# Patient Record
Sex: MALE | Race: WHITE | NOT HISPANIC OR LATINO | URBAN - METROPOLITAN AREA
[De-identification: names, ages, dates, MRNs, and addresses within clinical notes are randomized per-mention and may not be internally consistent; named-entity substitution may affect disease eponyms.]

---

## 2017-12-19 ENCOUNTER — OFFICE VISIT (OUTPATIENT)
Dept: URGENT CARE | Facility: CLINIC | Age: 37
End: 2017-12-19

## 2017-12-19 VITALS
DIASTOLIC BLOOD PRESSURE: 84 MMHG | TEMPERATURE: 97.4 F | SYSTOLIC BLOOD PRESSURE: 112 MMHG | HEIGHT: 71 IN | OXYGEN SATURATION: 98 % | BODY MASS INDEX: 31.08 KG/M2 | RESPIRATION RATE: 20 BRPM | HEART RATE: 82 BPM | WEIGHT: 222 LBS

## 2017-12-19 DIAGNOSIS — R07.9 CHEST PAIN, UNSPECIFIED TYPE: ICD-10-CM

## 2017-12-19 DIAGNOSIS — R20.2 PARESTHESIA OF ARM: ICD-10-CM

## 2017-12-19 DIAGNOSIS — F41.9 ANXIETY: ICD-10-CM

## 2017-12-19 PROCEDURE — 99204 OFFICE O/P NEW MOD 45 MIN: CPT | Performed by: PHYSICIAN ASSISTANT

## 2017-12-19 RX ORDER — HYDROXYZINE HYDROCHLORIDE 25 MG/1
25 TABLET, FILM COATED ORAL 3 TIMES DAILY PRN
Qty: 30 TAB | Refills: 0 | Status: SHIPPED | OUTPATIENT
Start: 2017-12-19

## 2017-12-19 ASSESSMENT — ENCOUNTER SYMPTOMS
PALPITATIONS: 0
VOMITING: 0
MYALGIAS: 0
DIARRHEA: 0
TREMORS: 0
FLANK PAIN: 0
CHILLS: 0
NERVOUS/ANXIOUS: 1
WHEEZING: 0
NECK PAIN: 0
SHORTNESS OF BREATH: 0
LOSS OF CONSCIOUSNESS: 0
FEVER: 0
SPEECH CHANGE: 0
BRUISES/BLEEDS EASILY: 0
FOCAL WEAKNESS: 0
BACK PAIN: 0
ABDOMINAL PAIN: 0
SEIZURES: 0
COUGH: 0
TINGLING: 1
BLOOD IN STOOL: 0
SENSORY CHANGE: 1
NAUSEA: 0
DIZZINESS: 0
CONSTIPATION: 0
EXTREMITY NUMBNESS: 1
SPUTUM PRODUCTION: 0

## 2017-12-19 NOTE — PROGRESS NOTES
Subjective:     Arnav Hernandez is a 37 y.o. male who presents for Chest Pain and Numbness (LT arm)       Pt notes last one week of checking BP, noted some tingling to hands notes while watching tv, wakes from sleep, driving, BP in the am has ranged in 120-130/80-90, felt trace pain to chest. Denies PMH of similar prior to last one week. Notes exercised a fair amount but not over last one week, noted mild chest pain last night and upon waking this am, had physical labor yesterday w/ no chest pain. C/o intermittent mild tingling to all fingers of bilat hands, PMH of PTSD and some anxiety, may contribute per pt, denies recent stimulants, cut out coffee last few days       Chest Pain    This is a new problem. Pertinent negatives include no abdominal pain, back pain, cough, dizziness, fever, nausea, palpitations, shortness of breath, sputum production or vomiting.   Pertinent negatives for past medical history include no seizures.   Numbness   Pertinent negatives include no abdominal pain, chest pain ( not currently, resolved, felt 'trace' this am/last night), chills, coughing, fever, myalgias, nausea, neck pain, rash or vomiting.   History reviewed. No pertinent past medical history.  Past Surgical History:   Procedure Laterality Date   • OTHER      3 meniscus rep right knee and 2 on the left   • OTHER ABDOMINAL SURGERY      mesh in abdominal wall - blunt force trauma   • OTHER ORTHOPEDIC SURGERY      left shoulder labrum repair     Social History     Social History   • Marital status: Single     Spouse name: N/A   • Number of children: N/A   • Years of education: N/A     Occupational History   • Not on file.     Social History Main Topics   • Smoking status: Never Smoker   • Smokeless tobacco: Never Used   • Alcohol use Yes      Comment: daily   • Drug use: No   • Sexual activity: Not on file     Other Topics Concern   • Not on file     Social History Narrative   • No narrative on file    History reviewed. No  "pertinent family history. Review of Systems   Constitutional: Negative for chills and fever.   Respiratory: Negative for cough, sputum production, shortness of breath and wheezing.    Cardiovascular: Negative for chest pain ( not currently, resolved, felt 'trace' this am/last night), palpitations and leg swelling.   Gastrointestinal: Negative for abdominal pain, blood in stool, constipation, diarrhea, nausea and vomiting.   Genitourinary: Negative for dysuria, flank pain and hematuria.   Musculoskeletal: Negative for back pain, myalgias and neck pain.   Skin: Negative for rash.   Neurological: Positive for tingling and sensory change ( intermittent tingling to fingers). Negative for dizziness, tremors, speech change, focal weakness, seizures and loss of consciousness.   Endo/Heme/Allergies: Does not bruise/bleed easily.   Psychiatric/Behavioral: The patient is nervous/anxious ( pt admits some PMH).    No Known Allergies   Objective:   /84 Comment: /70  Pulse 82   Temp 36.3 °C (97.4 °F)   Resp 20   Ht 1.803 m (5' 11\")   Wt 100.7 kg (222 lb)   SpO2 98%   BMI 30.96 kg/m²   Physical Exam   Constitutional: He is oriented to person, place, and time. He appears well-developed and well-nourished. No distress.   HENT:   Head: Normocephalic and atraumatic.   Right Ear: Tympanic membrane, external ear and ear canal normal.   Left Ear: Tympanic membrane, external ear and ear canal normal.   Nose: Nose normal.   Mouth/Throat: Uvula is midline, oropharynx is clear and moist and mucous membranes are normal.   Eyes: Conjunctivae are normal. Right eye exhibits no discharge. Left eye exhibits no discharge. No scleral icterus.   Neck: Neck supple.   Cardiovascular: Normal rate, regular rhythm, normal heart sounds and intact distal pulses.   No extrasystoles are present. Exam reveals no gallop and no friction rub.    No murmur heard.  Pulmonary/Chest: Effort normal and breath sounds normal. No respiratory distress. " He has no decreased breath sounds. He has no wheezes. He has no rhonchi. He has no rales.   Musculoskeletal: Normal range of motion.   Neurological: He is alert and oriented to person, place, and time. He has normal strength. No cranial nerve deficit or sensory deficit. Coordination and gait normal.   Bilat hands interos - 5/5, reports normal sensation to light touch, after rapid breathing during auscultation of chest pt noted return of mild tingling to all fingers, NEG tinnels at wrist and elbows bilat, all normal appearing   Skin: Skin is warm and dry. He is not diaphoretic. No pallor.   Psychiatric: He has a normal mood and affect.   Nursing note and vitals reviewed.    EKG in the office reveals a normal sinus rhythm with a rate of 68. There is no ectopy, no significant ST elevation, depression, no signs of ischemia or infarct.    Assessment/Plan:   Assessment    1. Chest pain, unspecified type  Supportive care is reviewed with patient/caregiver - recommend to push PO fluids and electrolytes, continue stimulant avoidance, ok to resume workouts, trend s/sx, keep sx and BP journal including times of day, HR/BP, f/u w/ PCP - referral placed today, ok to trial atarax for some anxiety relief  Return to clinic with lack of resolution or progression of symptoms.  ER precautions with any worsening symptoms are reviewed with patient/caregiver and they do express understanding  Some PMH of weight lifting, no clear etiology of tingling    - REFERRAL TO FAMILY PRACTICE    2. Paresthesia of arm    - REFERRAL TO FAMILY PRACTICE    3. Anxiety    - hydrOXYzine HCl (ATARAX) 25 MG Tab; Take 1 Tab by mouth 3 times a day as needed for Anxiety.  Dispense: 30 Tab; Refill: 0